# Patient Record
Sex: MALE | Race: WHITE | URBAN - METROPOLITAN AREA
[De-identification: names, ages, dates, MRNs, and addresses within clinical notes are randomized per-mention and may not be internally consistent; named-entity substitution may affect disease eponyms.]

---

## 2021-09-14 PROBLEM — E55.9 VITAMIN D DEFICIENCY: Status: ACTIVE | Noted: 2021-09-14

## 2021-09-14 PROBLEM — E78.5 HYPERLIPIDEMIA, UNSPECIFIED HYPERLIPIDEMIA TYPE: Status: ACTIVE | Noted: 2021-09-14

## 2024-08-05 ENCOUNTER — OFFICE VISIT (OUTPATIENT)
Dept: SURGERY | Facility: CLINIC | Age: 39
End: 2024-08-05
Payer: COMMERCIAL

## 2024-08-05 VITALS — HEART RATE: 51 BPM | DIASTOLIC BLOOD PRESSURE: 78 MMHG | SYSTOLIC BLOOD PRESSURE: 117 MMHG

## 2024-08-05 DIAGNOSIS — Z80.42 FAMILY HISTORY OF PROSTATE CANCER IN FATHER: ICD-10-CM

## 2024-08-05 DIAGNOSIS — R35.1 NOCTURIA: Primary | ICD-10-CM

## 2024-08-05 DIAGNOSIS — R82.90 ABNORMAL URINE FINDING: ICD-10-CM

## 2024-08-05 LAB
APPEARANCE: CLEAR
BILIRUBIN: NEGATIVE
GLUCOSE (URINE DIPSTICK): NEGATIVE MG/DL
KETONES (URINE DIPSTICK): NEGATIVE MG/DL
LEUKOCYTES: NEGATIVE
MULTISTIX LOT#: ABNORMAL NUMERIC
NITRITE, URINE: NEGATIVE
PH, URINE: 5.5 (ref 4.5–8)
PROTEIN (URINE DIPSTICK): NEGATIVE MG/DL
SPECIFIC GRAVITY: >=1.03 (ref 1–1.03)
URINE-COLOR: YELLOW
UROBILINOGEN,SEMI-QN: 0.2 MG/DL (ref 0–1.9)

## 2024-08-05 PROCEDURE — 81002 URINALYSIS NONAUTO W/O SCOPE: CPT | Performed by: UROLOGY

## 2024-08-05 PROCEDURE — 3078F DIAST BP <80 MM HG: CPT | Performed by: UROLOGY

## 2024-08-05 PROCEDURE — 99244 OFF/OP CNSLTJ NEW/EST MOD 40: CPT | Performed by: UROLOGY

## 2024-08-05 PROCEDURE — 3074F SYST BP LT 130 MM HG: CPT | Performed by: UROLOGY

## 2024-08-05 RX ORDER — SOLIFENACIN SUCCINATE 5 MG/1
5 TABLET, FILM COATED ORAL DAILY
Qty: 30 TABLET | Refills: 2 | Status: SHIPPED | OUTPATIENT
Start: 2024-08-05 | End: 2024-08-05

## 2024-08-05 RX ORDER — SOLIFENACIN SUCCINATE 5 MG/1
5 TABLET, FILM COATED ORAL DAILY
Qty: 30 TABLET | Refills: 2 | Status: SHIPPED | OUTPATIENT
Start: 2024-08-05

## 2024-08-05 RX ORDER — ROSUVASTATIN CALCIUM 5 MG/1
5 TABLET, COATED ORAL DAILY
COMMUNITY
Start: 2024-06-13

## 2024-08-05 NOTE — PROGRESS NOTES
[facility] Urology  Initial Office Consultation    HPI:   Behrad Golshani is a 38 year old male here today for consultation at the request of, and a copy of this note will be sent to, Nidhi Cohen MD.    1. Nocturia  Patient presents for evaluation of nocturia.  He reports waking up anywhere between 1-3 times at night to urinate.  He reports significant bother associated with his nocturia.  He denies any significant daytime symptoms.  Occasionally reports some urinary frequency more than half the time.    His IPSS is 9 (3/4/0/0/0/0/2) with a quality-of-life score of 4.    He tried limiting fluids in the evening.    He reports snoring.  He is in the process of being evaluated for sleep apnea.    He denies gross hematuria or dysuria.    Bladder scan today shows a PVR of 0 mL.    Dipstick UA today showing small blood, no leuks or nitrites.    Dipstick UA through his PCP 7/24/2024 showed trace blood.  Urine culture was negative.    He has family history of prostate cancer in his father.  He had a screening PSA level 7/24/2024 which was normal at 0.70 ng/mL.      PAST MEDICAL HISTORY: Hyperlipidemia.    PAST SURGICAL HISTORY: None.    SOCIAL HISTORY: .  2 children.  No smoking or illicit drug use.  Social alcohol.  Works as a radiologist.     Family History   Problem Relation Age of Onset    No Known Problems Mother     Other (HTN, HL, Prostate cancer in his 60s) Father      Allergies: Sulfa antibiotics      REVIEW OF SYSTEMS:  Pertinent positives and negatives per HPI. A 12-point ROS was performed and is otherwise negative.       EXAM:  /78   Pulse 51     Physical Exam  Constitutional:       General: He is not in acute distress.     Appearance: He is well-developed.   HENT:      Head: Normocephalic.   Eyes:      General: No scleral icterus.  Cardiovascular:      Rate and Rhythm: Normal rate.   Pulmonary:      Effort: Pulmonary effort is normal.   Musculoskeletal:         General: Normal  range of motion.   Skin:     General: Skin is warm and dry.   Neurological:      Mental Status: He is alert and oriented to person, place, and time.   Psychiatric:         Mood and Affect: Mood normal.         Behavior: Behavior normal.       LABS:  No results found.      IMAGING:  No results found.      IMPRESSION & PLAN:  38 year old male with bothersome nocturia.    Findings reviewed with patient.  Discussed relevant anatomy and physiology.    Agree with being evaluated for sleep apnea given provided history as this might be the contributing factor to his nocturia.    Discussed behavioral changes and fluid management.  We reviewed options for medical therapy including possible trial of anticholinergic medication.  Benefits, risk, side effects were discussed.  Patient agreed to receive a prescription and will consider starting the medication.  If he starts it, I would like him to return in about 6 weeks for an update on symptoms and an updated bladder scan/PVR.    Finally, discussed his dipstick urinalysis showing small blood.  Will send this down to the lab for microscopic analysis.  He will be notified if there is any microscopic hematuria.    All questions answered.      Yann Pandey MD  8/5/2024

## 2025-08-21 ENCOUNTER — NURSE ONLY (OUTPATIENT)
Dept: INTERNAL MEDICINE CLINIC | Facility: HOSPITAL | Age: 40
End: 2025-08-21
Attending: PREVENTIVE MEDICINE

## 2025-08-21 DIAGNOSIS — Z00.00 WELLNESS EXAMINATION: Primary | ICD-10-CM

## 2025-08-21 PROCEDURE — 86480 TB TEST CELL IMMUN MEASURE: CPT

## 2025-08-25 LAB
M TB IFN-G CD4+ T-CELLS BLD-ACNC: 0.03 IU/ML
M TB TUBERC IFN-G BLD QL: NEGATIVE
M TB TUBERC IGNF/MITOGEN IGNF CONTROL: >10 IU/ML
QFT TB1 AG MINUS NIL: 0 IU/ML
QFT TB2 AG MINUS NIL: 0 IU/ML